# Patient Record
Sex: FEMALE | URBAN - METROPOLITAN AREA
[De-identification: names, ages, dates, MRNs, and addresses within clinical notes are randomized per-mention and may not be internally consistent; named-entity substitution may affect disease eponyms.]

---

## 2022-02-11 ENCOUNTER — APPOINTMENT (RX ONLY)
Dept: URBAN - METROPOLITAN AREA OTHER 11 | Facility: OTHER | Age: 57
Setting detail: DERMATOLOGY
End: 2022-02-11

## 2022-02-11 DIAGNOSIS — L20.89 OTHER ATOPIC DERMATITIS: ICD-10-CM

## 2022-02-11 PROBLEM — L20.84 INTRINSIC (ALLERGIC) ECZEMA: Status: ACTIVE | Noted: 2022-02-11

## 2022-02-11 PROCEDURE — ? PRESCRIPTION MEDICATION MANAGEMENT

## 2022-02-11 PROCEDURE — ? COUNSELING

## 2022-02-11 PROCEDURE — 99213 OFFICE O/P EST LOW 20 MIN: CPT

## 2022-02-11 ASSESSMENT — SEVERITY ASSESSMENT 2020: SEVERITY 2020: MODERATE

## 2022-02-11 ASSESSMENT — LOCATION SIMPLE DESCRIPTION DERM
LOCATION SIMPLE: POSTERIOR NECK
LOCATION SIMPLE: RIGHT FOREARM

## 2022-02-11 ASSESSMENT — LOCATION ZONE DERM
LOCATION ZONE: ARM
LOCATION ZONE: NECK

## 2022-02-11 ASSESSMENT — LOCATION DETAILED DESCRIPTION DERM
LOCATION DETAILED: MID POSTERIOR NECK
LOCATION DETAILED: RIGHT PROXIMAL DORSAL FOREARM

## 2022-02-11 NOTE — PROCEDURE: PRESCRIPTION MEDICATION MANAGEMENT
Render In Strict Bullet Format?: No
Plan: Consider biopsy if not improved.
Detail Level: Zone
Continue Regimen: Clobetasol ointment prescribed by PCP BID x3 weeks one week off prn

## 2022-02-11 NOTE — PROCEDURE: COUNSELING
-- Message is from the [facility]--      Patient is requesting a medication refill - medication is on active list    Was Medication Pended? Yes.    Rx Name and Dose: Levothytoxine 88mg take 1x daily     Duration: 30 days    Pharmacy  [facility] [address] [place] [address] [address]    Patient confirmed the above pharmacy as correct?  Yes    Caller Information       Type Contact Phone    09/03/2019 03:42 PM Phone (Incoming) Kellie Guerrero (Self) [phone] (H)          Alternative phone number: none    Turnaround time given to caller:   \"This message will be sent to [state Provider's name]. The clinical team will fulfill your request as soon as they review your message.\"  
Please call patient.  She must return to clinic for proper follow-up.  2-week supply.. Thank you  
Detail Level: Simple

## 2022-02-11 NOTE — HPI: DERMATOLOGY CONSULTATION
How Severe Is Your Condition? (The Patient Describes The Severity Level As....): moderate
What Is The Consultation For? (Seen In Consultation For...): Rash
Which Provider Consulted Us?: Ni Morales MD
When Did The Patient First Notice This? (The Patient First Noticed It...): over a year on neck, few months on forearm
Where On Your Body Is It? (Located On The...): posterior neck and right forearm
Any Associated Symptoms? (The Symptoms Include.....): severe itching, redness
What Happened Since That Time? (Since That Time...): gradually worsening
What Previous Treatments Have Been Tried? (The Patient Has Tried The Following Treatments...): Clobetasol ointment with some improvement.
Additional Comments: No family history of psoriasis.